# Patient Record
Sex: FEMALE | Race: WHITE | NOT HISPANIC OR LATINO | URBAN - METROPOLITAN AREA
[De-identification: names, ages, dates, MRNs, and addresses within clinical notes are randomized per-mention and may not be internally consistent; named-entity substitution may affect disease eponyms.]

---

## 2017-02-21 ENCOUNTER — OFFICE VISIT - RIVER FALLS (OUTPATIENT)
Dept: FAMILY MEDICINE | Facility: CLINIC | Age: 82
End: 2017-02-21

## 2017-02-21 ASSESSMENT — MIFFLIN-ST. JEOR: SCORE: 965.12

## 2017-04-04 ENCOUNTER — OFFICE VISIT - RIVER FALLS (OUTPATIENT)
Dept: FAMILY MEDICINE | Facility: CLINIC | Age: 82
End: 2017-04-04

## 2017-04-19 ENCOUNTER — OFFICE VISIT - RIVER FALLS (OUTPATIENT)
Dept: FAMILY MEDICINE | Facility: CLINIC | Age: 82
End: 2017-04-19

## 2017-04-19 ASSESSMENT — MIFFLIN-ST. JEOR: SCORE: 966.03

## 2017-06-15 ENCOUNTER — OFFICE VISIT - RIVER FALLS (OUTPATIENT)
Dept: FAMILY MEDICINE | Facility: CLINIC | Age: 82
End: 2017-06-15

## 2017-06-15 ASSESSMENT — MIFFLIN-ST. JEOR: SCORE: 946.07

## 2017-07-31 ENCOUNTER — OFFICE VISIT - RIVER FALLS (OUTPATIENT)
Dept: FAMILY MEDICINE | Facility: CLINIC | Age: 82
End: 2017-07-31

## 2017-07-31 ASSESSMENT — MIFFLIN-ST. JEOR: SCORE: 932.47

## 2017-12-12 ENCOUNTER — AMBULATORY - RIVER FALLS (OUTPATIENT)
Dept: FAMILY MEDICINE | Facility: CLINIC | Age: 82
End: 2017-12-12

## 2017-12-13 LAB
CHOLEST SERPL-MCNC: 125 MG/DL
CHOLEST/HDLC SERPL: 2.7 {RATIO}
CREAT SERPL-MCNC: 1.21 MG/DL (ref 0.6–0.88)
GLUCOSE BLD-MCNC: 99 MG/DL (ref 65–99)
HDLC SERPL-MCNC: 47 MG/DL
LDLC SERPL CALC-MCNC: 58 MG/DL
NONHDLC SERPL-MCNC: 78 MG/DL
TRIGL SERPL-MCNC: 118 MG/DL

## 2017-12-19 ENCOUNTER — OFFICE VISIT - RIVER FALLS (OUTPATIENT)
Dept: FAMILY MEDICINE | Facility: CLINIC | Age: 82
End: 2017-12-19

## 2017-12-19 ASSESSMENT — MIFFLIN-ST. JEOR: SCORE: 932.47

## 2018-05-09 ENCOUNTER — OFFICE VISIT - RIVER FALLS (OUTPATIENT)
Dept: FAMILY MEDICINE | Facility: CLINIC | Age: 83
End: 2018-05-09

## 2019-11-07 ENCOUNTER — COMMUNICATION - RIVER FALLS (OUTPATIENT)
Dept: FAMILY MEDICINE | Facility: CLINIC | Age: 84
End: 2019-11-07

## 2019-11-12 ENCOUNTER — AMBULATORY - RIVER FALLS (OUTPATIENT)
Dept: FAMILY MEDICINE | Facility: CLINIC | Age: 84
End: 2019-11-12

## 2019-11-12 LAB
ALBUMIN UR-MCNC: NEGATIVE G/DL
APPEARANCE UR: NORMAL
BILIRUB UR QL STRIP: NEGATIVE
COLOR UR AUTO: YELLOW
GLUCOSE UR STRIP-MCNC: NEGATIVE MG/DL
HGB UR QL STRIP: NEGATIVE
KETONES UR STRIP-MCNC: NEGATIVE MG/DL
LEUKOCYTE ESTERASE UR QL STRIP: NORMAL
NITRATE UR QL: POSITIVE
PH UR STRIP: 6 [PH]
SP GR UR STRIP: 1.01
UROBILINOGEN UR STRIP-MCNC: NORMAL MG/DL

## 2019-11-14 LAB — BACTERIA SPEC CULT: ABNORMAL

## 2019-11-16 ENCOUNTER — COMMUNICATION - RIVER FALLS (OUTPATIENT)
Dept: FAMILY MEDICINE | Facility: CLINIC | Age: 84
End: 2019-11-16

## 2020-03-09 ENCOUNTER — COMMUNICATION - RIVER FALLS (OUTPATIENT)
Dept: FAMILY MEDICINE | Facility: CLINIC | Age: 85
End: 2020-03-09

## 2020-06-25 ENCOUNTER — COMMUNICATION - RIVER FALLS (OUTPATIENT)
Dept: FAMILY MEDICINE | Facility: CLINIC | Age: 85
End: 2020-06-25

## 2022-02-12 VITALS
BODY MASS INDEX: 30.44 KG/M2 | WEIGHT: 148 LBS | TEMPERATURE: 96.5 F | BODY MASS INDEX: 31.07 KG/M2 | DIASTOLIC BLOOD PRESSURE: 70 MMHG | SYSTOLIC BLOOD PRESSURE: 132 MMHG | OXYGEN SATURATION: 2 % | HEIGHT: 58 IN | HEART RATE: 70 BPM | HEIGHT: 58 IN | DIASTOLIC BLOOD PRESSURE: 74 MMHG | OXYGEN SATURATION: 90 % | TEMPERATURE: 97.8 F | WEIGHT: 145 LBS | SYSTOLIC BLOOD PRESSURE: 114 MMHG | HEART RATE: 74 BPM

## 2022-02-12 VITALS
WEIGHT: 145 LBS | HEART RATE: 84 BPM | HEIGHT: 58 IN | SYSTOLIC BLOOD PRESSURE: 128 MMHG | DIASTOLIC BLOOD PRESSURE: 82 MMHG | TEMPERATURE: 97 F | BODY MASS INDEX: 30.44 KG/M2

## 2022-02-12 VITALS
DIASTOLIC BLOOD PRESSURE: 78 MMHG | HEART RATE: 76 BPM | TEMPERATURE: 97.5 F | WEIGHT: 152.4 LBS | HEIGHT: 58 IN | BODY MASS INDEX: 31.99 KG/M2 | SYSTOLIC BLOOD PRESSURE: 136 MMHG

## 2022-02-12 VITALS
SYSTOLIC BLOOD PRESSURE: 124 MMHG | DIASTOLIC BLOOD PRESSURE: 64 MMHG | WEIGHT: 152.2 LBS | HEIGHT: 58 IN | OXYGEN SATURATION: 93 % | TEMPERATURE: 98 F | HEART RATE: 80 BPM | BODY MASS INDEX: 31.95 KG/M2

## 2022-02-15 NOTE — TELEPHONE ENCOUNTER
---------------------  From: Roseanne Hdez CMA (Gian[facility] Pool ([address]_[place]))   To: Randolph Watson MD;     Sent: 8/22/2019 4:18:27 PM CDT  Subject: FW: Medication Management   Due Date/Time: 8/23/2019 11:19:00 AM CDT     Pt is on hospice at [facility]. Please address refill. Last seen on 12/19/17.  Last filled on 6/26/19 #30 w/ 0 refills.      ------------------------------------------  From: [id number]  To: Randolph Watson MD  Sent: August 22, 2019 11:19:47 AM CDT  Subject: Medication Management  Due: August 23, 2019 11:19:47 AM CDT    ** On Hold Pending Signature **  Drug: levothyroxine (levothyroxine 25 mcg (0.025 mg) oral tablet)  TAKE 1 TAB BY MOUTH ONCE DAILY  Quantity: 30 tab(s)     Days Supply: 28        Refills: 0  Substitutions Allowed  Notes from Pharmacy: NEED NEW SCRIPT PLEASE - THANK YOU    Dispensed Drug: levothyroxine (levothyroxine 25 mcg (0.025 mg) oral tablet)  TAKE 1 TAB BY MOUTH ONCE DAILY  Quantity: 30 tab(s)     Days Supply: 28        Refills: 0  Substitutions Allowed  Notes from Pharmacy: NEED NEW SCRIPT PLEASE - THANK YOU  ---------------------------------------------------------------  From: Randolph Watson MD   [facility]    Sent: 8/22/2019 4:38:13 PM CDT  Subject: FW: Medication Management     ** Submitted: **  Complete:levothyroxine (levothyroxine 25 mcg (0.025 mg) oral tablet)   Signed by Randolph Watson MD  8/22/2019 4:38:00 PM    ** Approved **  levothyroxine (LEVOTHYROXINE 25MCG TABLET)  TAKE 1 TAB BY MOUTH ONCE DAILY  Qty:  30 tab(s)        Days Supply:  28        Refills:  0          Substitutions Allowed     Route To Pharmacy - [id number]   Note from Pharmacy:  NEED NEW SCRIPT PLEASE - THANK YOU

## 2022-02-15 NOTE — TELEPHONE ENCOUNTER
---------------------  From: Emma Booker MA (eRx Pool ([address]_[place]   To: Randolph Watson MD;     Sent: 12/13/2019 8:10:22 AM CST  Subject: FW: Medication Management   Due Date/Time: 12/13/2019 6:21:00 PM CST     PCP:  CHT    Medication:   Levothyroxine  Last Filled:  10/17/19    Quantity:  30  Refills:  0    Medication:   Mirtazipine  Last Filled:  9/19/19    Quantity:  30  Refills:  2    Date of last office visit and reason:   12/19/17  Date of last labs pertaining to condition:  12/12/17 TSH    Note:  Patient appears to be on Hospice, should we fill medications or does she need to be seen?    Return to Clinic order placed?  Yes            ------------------------------------------  From: [facility]  To: Randolph Watson MD  Sent: December 12, 2019 6:21:22 PM CST  Subject: Medication Management  Due: December 13, 2019 6:21:22 PM CST    ** On Hold Pending Signature **  Drug: mirtazapine (mirtazapine 30 mg oral tablet)  TAKE 1 TABLET BY MOUTH AT BEDTIME  Quantity: 30 tab(s)  Days Supply: 14  Refills: 0  Substitutions Allowed  Notes from Pharmacy:     Dispensed Drug: mirtazapine (mirtazapine 30 mg oral tablet)  TAKE 1 TABLET BY MOUTH AT BEDTIME  Quantity: 30 tab(s)  Days Supply: 14  Refills: 0  Substitutions Allowed  Notes from Pharmacy:     ** On Hold Pending Signature **  Drug: levothyroxine (levothyroxine 25 mcg (0.025 mg) oral tablet)  TAKE 1 TAB BY MOUTH ONCE DAILY  Quantity: 30 tab(s)  Days Supply: 28  Refills: 0  Substitutions Allowed  Notes from Pharmacy:     Dispensed Drug: levothyroxine (levothyroxine 25 mcg (0.025 mg) oral tablet)  TAKE 1 TAB BY MOUTH ONCE DAILY  Quantity: 30 tab(s)  Days Supply: 28  Refills: 0  Substitutions Allowed  Notes from Pharmacy:   ---------------------------------------------------------------  From: Randolph Watson MD   To: [id number]    Sent: 12/13/2019 8:27:42 AM CST  Subject: FW: Medication Management      ** Submitted: **  Complete:levothyroxine (levothyroxine 25 mcg (0.025 mg) oral tablet)   Signed by Randolph Watson MD  12/13/2019 8:27:00 AM    ** Submitted: **  Complete:mirtazapine (mirtazapine 30 mg oral tablet)   Signed by Randolph Watson MD  12/13/2019 8:27:00 AM    ** Approved **  mirtazapine (MIRTAZAPINE 30MG TABLET)  TAKE 1 TABLET BY MOUTH AT BEDTIME  Qty:  30 tab(s)        Days Supply:  14        Refills:  0          Substitutions Allowed     Route To [facility] [address] [place] [address]       ** Approved **  levothyroxine (LEVOTHYROXINE 25MCG TABLET)  TAKE 1 TAB BY MOUTH ONCE DAILY  Qty:  30 tab(s)        Days Supply:  28        Refills:  0          Substitutions Allowed     Route To [address]

## 2022-02-15 NOTE — TELEPHONE ENCOUNTER
---------------------  From: Cassandra Espinal CMA (eRx Pool ([address]   To: Randolph Watson MD;     Sent: 2/13/2019 9:47:45 AM CST  Subject: FW: Medication Management   Due Date/Time: 2/13/2019 5:21:00 PM CST     PCP:   CHT     Medication:   acetaminophen and mirtazapine   Last Filled:      Quantity:    Refills:      Date of last office visit and reason:   12/19/17  Date of last labs pertaining to condition:  12/12/17    Note:  refill request from pharmacy, patient is on hospice okay to fill?    Return to Clinic order placed?  yes was due in December 2018    Resource:   _  Phone:   _         ** Patient matched by Cassandra Espinal CMA on 2/13/2019 9:42:56 AM CST **      ------------------------------------------  From: [facility]  To: Betsey SALEEM, [facility]  Sent: February 12, 2019 5:21:26 PM CST  Subject: Medication Management  Due: February 13, 2019 5:21:26 PM CST    ** On Hold Pending Signature **  Drug: acetaminophen (acetaminophen 500 mg oral tablet)  TAKE 2 TABS (1000MG) BY TWICE DAILY.  Quantity: 120 tab(s)    Days Supply: 14        Refills: 0  Substitutions Allowed  Notes from Pharmacy: 2ND FAX REQUEST.    Dispensed Drug: acetaminophen (acetaminophen 500 mg oral tablet)  TAKE 2 TABS (1000MG) BY TWICE DAILY.  Quantity: 120 tab(s)    Days Supply: 14        Refills: 0  Substitutions Allowed  Notes from Pharmacy: 2ND FAX REQUEST.    ** On Hold Pending Signature **  Drug: mirtazapine (mirtazapine 30 mg oral tablet)  TAKE 1 TAB BY MOUTH AT BEDTIME  Quantity: 30 tab(s)     Days Supply: 14        Refills: 0  Substitutions Allowed  Notes from Pharmacy: 2ND FAX REQUEST.    Dispensed Drug: mirtazapine (mirtazapine 30 mg oral tablet)  TAKE 1 TAB BY MOUTH AT BEDTIME  Quantity: 30 tab(s)     Days Supply: 14        Refills: 0  Substitutions Allowed  Notes from Pharmacy: 2ND FAX REQUEST.  ---------------------------------------------------------------  From: Shirley SALEEM [place]   To:  [facility]    Sent: 2/13/2019 10:01:17 AM CST  Subject: FW: Medication Management     ** Submitted: **  Complete:acetaminophen (acetaminophen 500 mg oral tablet)   Signed by Randolph Watson MD  2/13/2019 10:01:00 AM    ** Submitted: **  Complete:mirtazapine (mirtazapine 30 mg oral tablet)   Signed by Randolph Watson MD  2/13/2019 10:01:00 AM    ** Approved **  mirtazapine (MIRTAZAPINE 30MG TABLET)  TAKE 1 TAB BY MOUTH AT BEDTIME  Qty:  30 tab(s)        Days Supply:  14        Refills:  0          DELIO     Route To [facility] #7[facility]   Note from Pharmacy:  2ND FAX REQUEST.  Signed by Randolph Wtason MD    ** Approved **  acetaminophen (ACETAMINOPHEN 500MG ES TABLET)  TAKE 2 TABS (1000MG) BY TWICE DAILY.  Qty:  120 tab(s)        Days Supply:  14        Refills:  0          DELIO     Route To [facility] #786 [facility]   Note from Pharmacy:  2ND FAX REQUEST.  Signed by Randolph Watson MD

## 2022-02-15 NOTE — TELEPHONE ENCOUNTER
---------------------From: Roseanne Hdez CMA (Phone Messages Pool ([address]_[place] To: Judah Leggett MD;   Sent: 11/12/2019 5:29:59 PM CSTSubject: Phone Note: Urine Sample Phone MessagePCP:   CHT  Time of Call:  ??Phone number:  [phone] call at: 5:28 pmNote:   Daughter Priya dropped off a urine sample as patient has been very confused and emotional. Wondering if she has a UTI. Attached are the results to her dipstick and a culture is pending. Do you want to treat now or wait for culture results? Pharmacy: [facility] office visit and reason: 12/19/17Transferred to: Yoav separate note

## 2022-02-15 NOTE — TELEPHONE ENCOUNTER
---------------------  From: Emma Booker MA (eRx Pool ([address]   To: Randolph Watson MD;     Sent: 3/20/2020 8:23:43 AM CDT  Subject: FW: Medication Management   Due Date/Time: 3/20/2020 6:57:00 PM CDT       PCP:   CHT     Medication:  Tylenol  Last Filled:  11/7/19    Quantity:  120  Refills:  0    Date of last office visit and reason:   12/19/2017  Date of last labs pertaining to condition:  n/a    Note:  _    Return to Clinic order placed?  _          ** Not Approved: Medication never prescribed for patient **  sertraline (SERTRALINE 100MG TABLET)  TAKE 1 TABLET BY MOUTH DAILY  Qty:  30 tab(s)        Days Supply:  28        Refills:  0          Substitutions Allowed     Route To Pharmacy - [facility] [address]   Note from Pharmacy:  2ND FAX REQUEST. NEED REFILLS PLEASE--THANK YOU  Signed by Emma Booker MA            ** Patient matched by Emma Booker MA on 3/20/2020 8:19:11 AM CDT **      ------------------------------------------  From: [facility] [address]  To: Randolph Watson MD  Sent: March 19, 2020 6:57:44 PM CDT  Subject: Medication Management  Due: March 20, 2020 6:57:44 PM CDT    ** On Hold Pending Signature **  Drug: acetaminophen (acetaminophen 500 mg oral tablet)  TAKE 2 TABS (1000MG) BY MOUTH TWICE DAILY  Quantity: 120 tab(s)  Days Supply: 27  Refills: 0  Substitutions Allowed  Notes from Pharmacy: NEED REFILLS PLEASE--THANK YOU    Dispensed Drug: PAIN RELIEVER ES 500MG TABLET  TAKE 2 TABS (1000MG) BY MOUTH TWICE DAILY  Quantity: 120 tab(s)  Days Supply: 27  Refills: 0  Substitutions Allowed  Notes from Pharmacy: NEED REFILLS PLEASE--THANK YOU    ** On Hold Pending Signature **  Drug: sertraline (sertraline 100 mg oral tablet)  TAKE 1 TABLET BY MOUTH DAILY  Quantity: 30 tab(s)  Days Supply: 28  Refills: 0  Substitutions Allowed  Notes from Pharmacy: 2ND FAX REQUEST. NEED REFILLS PLEASE--THANK YOU    Dispensed Drug: sertraline (sertraline 100  mg oral tablet)  TAKE 1 TABLET BY MOUTH DAILY  Quantity: 30 tab(s)  Days Supply: 28  Refills: 0  Substitutions Allowed  Notes from Pharmacy: 2ND FAX REQUEST. NEED REFILLS PLEASE--THANK YOU  ---------------------------------------------------------------  From: Randolph Watson MD   To: [facility]    Sent: 3/20/2020 9:20:22 AM CDT  Subject: FW: Medication Management     ** Approved **  Freetext Med (PAIN RELIEVER ES 500MG TABLET)  TAKE 2 TABS (1000MG) BY MOUTH TWICE DAILY  Qty:  120 tab(s)        Days Supply:  27        Refills:  0          Substitutions Allowed     Route To Pharmacy - [facility]   Note from Pharmacy:  NEED REFILLS PLEASE--THANK YOU

## 2022-02-15 NOTE — NURSING NOTE
RN from [facility] called at 12:08 to request a copy of the order given for Cipro on 11/12/19.  I could not print the prescription as it had been completed so I reprinted urine culture with prescription noted and faxed to [facility] [phone]

## 2022-02-15 NOTE — TELEPHONE ENCOUNTER
---------------------  From: Debbie Shin RN (Phone Messages Pool ([address]24_[facility]))   To: T Message Pool (13924_[facility]); Appointment Pool ([address]_[place]);     Sent: 11/17/2021 4:10:27 PM CST  Subject: Update from Hospice     Papito Roldan from [facility] calling to update the team the Faina passed away last night, November 16th.  If there are any questions his number is [phone].DOD noted in patient's registration.---------------------  From: Linh Kendall CMA ([facility] Message Pool ([address]_[facility]))   To: Shirley SALEEM [place];     Sent: 11/18/2021 8:47:43 AM CST  Subject: FW: Update from Hospice

## 2022-02-15 NOTE — TELEPHONE ENCOUNTER
---------------------  From: Cassandra Espinal CMA (eRx Pool ([address]   To: Randolph Watson MD;     Sent: 10/17/2019 11:44:08 AM CDT  Subject: FW: Medication Management   Due Date/Time: 10/18/2019 11:01:00 AM CDT     PCP:   CHT    Medication:   Levothyroxine 25mcg  Last Filled:      Quantity:    Refills:      Date of last office visit and reason:   12/19/17 Dementia  Date of last labs pertaining to condition:      Note:  Refill request from the pharmacy. Patient is currently on Hospice and is at [facility]     Return to Clinic order placed?  _    Resource:   _  Phone:   _       ------------------------------------------  From: [facility]  To: Randolph Watson MD  Sent: October 17, 2019 11:01:17 AM CDT  Subject: Medication Management  Due: October 18, 2019 11:01:17 AM CDT    ** On Hold Pending Signature **  Drug: levothyroxine (levothyroxine 25 mcg (0.025 mg) oral tablet)  TAKE 1 TAB BY MOUTH ONCE DAILY  Quantity: 30 tab(s)  Days Supply: 28  Refills: 0  Substitutions Allowed  Notes from Pharmacy:     Dispensed Drug: levothyroxine (levothyroxine 25 mcg (0.025 mg) oral tablet)  TAKE 1 TAB BY MOUTH ONCE DAILY  Quantity: 30 tab(s)  Days Supply: 28  Refills: 0  Substitutions Allowed  Notes from Pharmacy:   ---------------------------------------------------------------  From: Randolph Watson MD   To: [facility]    Sent: 10/17/2019 1:00:18 PM CDT  Subject: FW: Medication Management     ** Submitted: **  Complete:levothyroxine (levothyroxine 25 mcg (0.025 mg) oral tablet)   Signed by Randolph Watson MD  10/17/2019 1:00:00 PM    ** Approved **  levothyroxine (LEVOTHYROXINE 25MCG TABLET)  TAKE 1 TAB BY MOUTH ONCE DAILY  Qty:  30 tab(s)        Days Supply:  28        Refills:  0          Substitutions Allowed     Route To Pharmacy - [facility]

## 2022-02-15 NOTE — TELEPHONE ENCOUNTER
---------------------  From: Nicole Berg CMA (eRx Pool ([address]_[facility]))   To: Timothy Menon PA-C;     Sent: 9/2/2020 2:29:11 PM CDT  Subject: FW: Medication Management   Due Date/Time: 9/3/2020 10:47:00 AM CDT     CHT OC     last seen 12/2017 by [facility] for Dementia  pt has been in hospice care    please advise on refills      ------------------------------------------  From: [facility]  To: Randolph Watson MD  Sent: September 2, 2020 10:47:13 AM CDT  Subject: Medication Management  Due: September 1, 2020 8:07:28 PM CDT     ** On Hold Pending Signature **     Drug: sertraline (sertraline 100 mg oral tablet), TAKE 1 TABLET BY MOUTH DAILY  Quantity: 30 tab(s)  Days Supply: 28  Refills: 0  Substitutions Allowed  Notes from Pharmacy: NEED REFILLS PLEASE     Dispensed Drug: sertraline (sertraline 100 mg oral tablet), TAKE 1 TABLET BY MOUTH DAILY  Quantity: 30 tab(s)  Days Supply: 28  Refills: 0  Substitutions Allowed  Notes from Pharmacy: NEED REFILLS PLEASE  ---------------------------------------------------------------  From: Timothy Menon PA-C   To: [facility]    Sent: 9/2/2020 2:48:07 PM CDT  Subject: FW: Medication Management     ** Submitted: **  Complete:sertraline (sertraline 100 mg oral tablet)   Signed by Timothy Menon PA-C  9/2/2020 7:48:00 PM [facility]    ** Approved **  sertraline (SERTRALINE 100MG TABLET)  TAKE 1 TABLET BY MOUTH DAILY  Qty:  30 tab(s)        Days Supply:  28        Refills:  0          Substitutions Allowed     Route To Pharmacy - [facility]   Note from Pharmacy:  NEED REFILLS PLEASE  Signed by Timothy Menon PA-C

## 2022-02-15 NOTE — TELEPHONE ENCOUNTER
---------------------  From: Cassandra Espinal CMA (eRx Pool ([address]_[place]   To: Randolph Watson MD;     Sent: 3/8/2019 10:15:31 AM CST  Subject: FW: Medication Management   Due Date/Time: 3/8/2019 2:19:00 PM CST           ------------------------------------------  From: [facility]  To: Randolph Watson MD  Sent: March 7, 2019 2:19:14 PM CST  Subject: Medication Management  Due: March 8, 2019 2:19:14 PM CST    ** On Hold Pending Signature **  Drug: levothyroxine (levothyroxine 25 mcg (0.025 mg) oral tablet)  TAKE 1 TAB BY MOUTH ONCE DAILY  Quantity: 30 tab(s)     Days Supply: 28        Refills: 0  Substitutions Allowed  Notes from Pharmacy: NEED REFILLS PLEASE    Dispensed Drug: levothyroxine (levothyroxine 25 mcg (0.025 mg) oral tablet)  TAKE 1 TAB BY MOUTH ONCE DAILY  Quantity: 30 tab(s)     Days Supply: 28        Refills: 0  Substitutions Allowed  Notes from Pharmacy: NEED REFILLS PLEASE  ---------------------------------------------------------------  From: Randolph Watson MD   To: [facility]    Sent: 3/8/2019 10:22:28 AM CST  Subject: FW: Medication Management     ** Submitted: **  Complete:levothyroxine (levothyroxine 25 mcg (0.025 mg) oral tablet)   Signed by Randolph Watson MD  3/8/2019 10:22:00 AM    ** Approved **  levothyroxine (LEVOTHYROXINE 25MCG TABLET)  TAKE 1 TAB BY MOUTH ONCE DAILY  Qty:  30 tab(s)        Days Supply:  28        Refills:  0          Substitutions Allowed     Route To Pharmacy - [facility]   Note from Pharmacy:  NEED REFILLS PLEASE

## 2022-02-15 NOTE — TELEPHONE ENCOUNTER
---------------------  From: Paige Luo CMA (Gianax Pool ([address]_[place]   To: Randolph Watson MD;     Sent: 1/11/2019 8:21:26 AM CST  Subject: FW: Medication Management   Due Date/Time: 1/11/2019 4:28:00 PM CST     Medication Refill needing approval    PCP:   CHT    Medication:   levothyroxine and olanzapine    Date of last office visit and reason:   12/19/17; dementia  Date of last labs pertaining to condition:  12/12/17    Return to Clinic order placed?  yes; was due in December. Pt is a hospice patient - ok to fill?        ------------------------------------------  [facility]  To: Randolph Watson MD  Sent: January 10, 2019 4:28:11 PM CST  Subject: Medication Management  Due: January 11, 2019 4:28:11 PM CST    ** On Hold Pending Signature **  Drug: OLANZapine (OLANZapine 5 mg oral tablet)  TAKE 1 TAB BY MOUTH ONCE DAILY FOR DEPRESSION DISORDER  Quantity: 30 tab(s)     Days Supply: 28        Refills: 0  Substitutions Allowed  Notes from Pharmacy: NEED REFILLS PLEASE    Dispensed Drug: OLANZapine (OLANZapine 5 mg oral tablet)  TAKE 1 TAB BY MOUTH ONCE DAILY FOR DEPRESSION DISORDER  Quantity: 30 tab(s)     Days Supply: 28        Refills: 0  Substitutions Allowed  Notes from Pharmacy: NEED REFILLS PLEASE    ** On Hold Pending Signature **  Drug: levothyroxine (levothyroxine 25 mcg (0.025 mg) oral tablet)  TAKE 1 TAB BY MOUTH ONCE DAILY  Quantity: 30 tab(s)     Days Supply: 28        Refills: 0  Substitutions Allowed  Notes from Pharmacy: NEED REFILLS PLEASE    Dispensed Drug: levothyroxine (levothyroxine 25 mcg (0.025 mg) oral tablet)  TAKE 1 TAB BY MOUTH ONCE DAILY  Quantity: 30 tab(s)     Days Supply: 28        Refills: 0  Substitutions Allowed  Notes from Pharmacy: NEED REFILLS PLEASE  ---------------------------------------------------------------  From: Randolph Watson MD   [facility]    Sent: 1/11/2019 9:18:34 AM CST  Subject: FW:  Medication Management     ** Submitted: **  Complete:levothyroxine (levothyroxine 25 mcg (0.025 mg) oral tablet)   Signed by Randolph Watson MD  1/11/2019 9:18:00 AM    ** Submitted: **  Complete:OLANZapine (OLANZapine 5 mg oral tablet)   Signed by Randolph Watson MD  1/11/2019 9:18:00 AM    ** Approved **  OLANZapine (OLANZAPINE 5MG TABLET)  TAKE 1 TAB BY MOUTH ONCE DAILY FOR DEPRESSION DISORDER  Qty:  30 tab(s)        Days Supply:  28        Refills:  0          DELIO     Route To [facility] #786 [facility]   Note from Pharmacy:  NEED REFILLS PLEASE      ** Approved **  levothyroxine (LEVOTHYROXINE 25MCG TABLET)  TAKE 1 TAB BY MOUTH ONCE DAILY  Qty:  30 tab(s)        Days Supply:  28        Refills:  0          DELIO     Route To [facility] #786 [facility]   Note from Pharmacy:  NEED REFILLS PLEASE

## 2022-02-15 NOTE — TELEPHONE ENCOUNTER
---------------------  From: Yasmin Titus CMA (eRx Pool ([address]_[place]   To: Randolph Watson MD;     Sent: 4/3/2020 8:28:19 AM CDT  Subject: FW: Medication Management   Due Date/Time: 4/3/2020 6:10:00 PM CDT             ** Patient matched by Yasmin Titus CMA on 4/3/2020 8:25:43 AM CDT **      ------------------------------------------  From: [facility]  To: Randolph Watson MD  Sent: April 2, 2020 6:10:55 PM CDT  Subject: Medication Management  Due: April 3, 2020 6:10:55 PM CDT    ** On Hold Pending Signature **  Drug: sertraline (sertraline 100 mg oral tablet)  TAKE 1 TABLET BY MOUTH DAILY  Quantity: 30 tab(s)  Days Supply: 28  Refills: 0  Substitutions Allowed  Notes from Pharmacy: NEED REFILLS PLEASE - THANK YOU    Dispensed Drug: sertraline (sertraline 100 mg oral tablet)  TAKE 1 TABLET BY MOUTH DAILY  Quantity: 30 tab(s)  Days Supply: 28  Refills: 0  Substitutions Allowed  Notes from Pharmacy: NEED REFILLS PLEASE - THANK YOU    ** On Hold Pending Signature **  Drug: mirtazapine (mirtazapine 30 mg oral tablet)  TAKE 1 TABLET BY MOUTH AT BEDTIME  Quantity: 30 tab(s)  Days Supply: 28  Refills: 0  Substitutions Allowed  Notes from Pharmacy:     Dispensed Drug: mirtazapine (mirtazapine 30 mg oral tablet)  TAKE 1 TABLET BY MOUTH AT BEDTIME  Quantity: 30 tab(s)  Days Supply: 28  Refills: 0  Substitutions Allowed  Notes from Pharmacy:   ---------------------------------------------------------------  From: Randolph Watson MD   To: [facility]    Sent: 4/3/2020 8:41:31 AM CDT  Subject: FW: Medication Management     ** Submitted: **  Complete:mirtazapine (mirtazapine 30 mg oral tablet)   Signed by Randolph Watson MD  4/3/2020 1:41:00 PM    ** Approved **  sertraline (SERTRALINE 100MG TABLET)  TAKE 1 TABLET BY MOUTH DAILY  Qty:  30 tab(s)        Days Supply:  28        Refills:  0          Substitutions Allowed     Route To [facility]acy - [facility]   Note from Pharmacy:  NEED REFILLS PLEASE - THANK YOU      ** Approved **  mirtazapine (MIRTAZAPINE 30MG TABLET)  TAKE 1 TABLET BY MOUTH AT BEDTIME  Qty:  30 tab(s)        Days Supply:  28        Refills:  0          Substitutions Allowed     Route To Pharmacy - [facility]

## 2022-02-15 NOTE — TELEPHONE ENCOUNTER
---------------------  From: Julia Schmidt CMA (eRx Pool ([address]224_[facility]))   To: Advanced Practice Provider Pool ([address]_[facility]);     Sent: 10/30/2020 7:31:57 AM CDT  Subject: compazine refill     CHT OOC until 11/10.    Pt currently at [facility] in [place]. Med is not listed in EMR but noted through scanned hospice documents from 10/12/20.     prochlorperazine 10mg (1 tab oral q 6 hrs prn N&V)    Per [facility] - last filled 8/23/18 #120---------------------  From: Marisela Franklin (Advanced Practice Provider Pool ([address]224_[facility]))   To: eRx Pool ([address]_[place]);     Sent: 10/30/2020 8:12:08 AM CDT  Subject: RE: compazine refill     please send rx for #60 tabs with directions as below  ** Submitted: **  Order:prochlorperazine (prochlorperazine 10 mg oral tablet)  1 tab(s)  Oral  q6 hrs  Qty:  60 tab(s)        Refills:  0          Substitutions Allowed     PRN  nausea/vomitting      Route To Pharmacy - [facility]    Signed by Nicole Berg CMA  10/30/2020 1:56:00 PM [facility]

## 2022-02-15 NOTE — TELEPHONE ENCOUNTER
---------------------  From: Yasmin Titus CMA   To: Randolph Watson MD;     Sent: 9/19/2019 3:31:28 PM CDT  Subject: med refill- Mirtazapine       PCP:  CHT    Medication:  Mirtazapine  Last Filled: 7/25/19   Quantity: 30  Refills:  0    Date of last office visit and reason:  [facility] and [facility]  Date of last labs pertaining to condition: n/a    Note:  Please advise      [facility]  ** Submitted: **  Order:mirtazapine (mirtazapine 30 mg oral tablet)  1 tab(s)  Oral  qhs  Qty:  30 tab(s)        Refills:  2          Route To Pharmacy - [id number]    Signed by Randolph Watson MD  9/19/2019 4:16:00 PM---------------------  From: Randolph Watson MD   To: Yasmin Titus CMA;     Sent: 9/19/2019 4:16:38 PM CDT  Subject: RE: med refill- Mirtazapine     OKfelicia

## 2022-02-15 NOTE — TELEPHONE ENCOUNTER
---------------------  From: Yasmin Titus CMA (eRx Pool ([id number]_[facility]))   To: Randolph Watson MD;     Sent: 9/22/2020 9:30:31 AM CDT  Subject: FW: Medication Management   Due Date/Time: 9/22/2020 6:28:00 PM CDT           Entered by Yasmin Titus CMA on September 22, 2020 9:29:52 AM CDT  Hospice Patient    7/23/2020 # 30, 0    ------------------------------------------  From: [id number]  To: Randolph Watson MD  Sent: September 21, 2020 6:28:20 PM CDT  Subject: Medication Management  Due: September 9, 2020 4:21:06 PM CDT     ** On Hold Pending Signature **     Drug: mirtazapine (mirtazapine 30 mg oral tablet), TAKE 1 TABLET BY MOUTH AT BEDTIME  Quantity: 30 tab(s)  Days Supply: 29  Refills: 0  Substitutions Allowed  Notes from Pharmacy:     Dispensed Drug: mirtazapine (mirtazapine 30 mg oral tablet), TAKE 1 TABLET BY MOUTH AT BEDTIME  Quantity: 30 tab(s)  Days Supply: 29  Refills: 0  Substitutions Allowed  Notes from Pharmacy:     ** On Hold Pending Signature **     Drug: levothyroxine (levothyroxine 25 mcg (0.025 mg) oral tablet), TAKE 1 TAB BY MOUTH DAILY.  Quantity: 30 tab(s)  Days Supply: 28  Refills: 0  Substitutions Allowed  Notes from Pharmacy:     Dispensed Drug: levothyroxine (levothyroxine 25 mcg (0.025 mg) oral tablet), TAKE 1 TAB BY MOUTH DAILY.  Quantity: 30 tab(s)  Days Supply: 28  Refills: 0  Substitutions Allowed  Notes from Pharmacy:  ---------------------------------------------------------------  From: Randolph Watson MD   To: [facility]    Sent: 9/22/2020 10:00:14 AM CDT  Subject: FW: Medication Management     ** Submitted: **  Complete:levothyroxine (levothyroxine 25 mcg (0.025 mg) oral tablet)   Signed by Randolph Watson MD  9/22/2020 3:00:00 PM [facility]    ** Submitted: **  Complete:mirtazapine (mirtazapine 30 mg oral tablet)   Signed by Randolph Watson MD  9/22/2020 3:00:00 PM [facility]    ** Approved with modifications:  **  mirtazapine (MIRTAZAPINE 30MG TABLET)  TAKE 1 TABLET BY MOUTH AT BEDTIME  Qty:  30 tab(s)        Days Supply:  29        Refills:  11          Substitutions Allowed     Route To [facility]786 [facility]       ** Approved with modifications: **  levothyroxine (LEVOTHYROXINE 25MCG TABLET)  TAKE 1 TAB BY MOUTH DAILY.  Qty:  30 tab(s)        Days Supply:  28        Refills:  11          Substitutions Allowed     Route To [facility]

## 2022-02-15 NOTE — TELEPHONE ENCOUNTER
---------------------  From: Hodan Jaffe CMA   To: Andre Khan MD; Phone Messages Pool ([id number]_[facility]);     Cc: Judah Leggett MD;      Sent: 11/16/2019 2:31:29 PM CST !  Subject: urine culture result     Pt had urine sample dropped off on 11/12/19, KWL prescribed Cipro 250 mg 1 tab po, Q12hrs x 7 days  # 14 tabs.  Please advise if appropriate medication.     Allergies: mirtazapine, morphine, nizoral    Pharmacy: [facility]    Results:  Date Result Name Ind Value   11/12/2019 5:18 PM Culture Urine ((A)) See comment      TEST STATUS:       FINAL    SPECIMEN SOURCE:   URINE    SPECIMEN QUALITY:  ADEQUATE    Result:            Greater than 100,000 CFU/mL of Escherichia coli                              E.coli                            ----------------                            INT   MARIUSZ     AMOX/CLAVULANATE       S     <=2     AMPICILLIN             S     8     AMP/SULBACTAM          S     <=2     CEFAZOLIN              NR    <=4 **2     CEFEPIME               S     <=1     CEFTRIAXONE            S     <=1     CIPROFLOXACIN          S     <=0.25     ERTAPENEM              S     <=0.5     GENTAMICIN             S     <=1     IMIPENEM               S     <=0.25     LEVOFLOXACIN           S     <=0.12     NITROFURANTOIN         S     <=16     PIP/TAZOBACTAM         S     <=4     TOBRAMYCIN             S     <=1     TRIMETHOPRIM/SULFA     S     <=20    S=Susceptible  I=Intermediate  R=Resistant  * = Not Tested  NR = Not Reported  **NN = See Therapy Comments      THERAPY COMMENTS        Note 1:      For infections other than uncomplicated UTI      caused by E. coli, K. pneumoniae or P. mirabilis:      Cefazolin is resistant if MARIUSZ > or = 8 mcg/mL.      (Distinguishing susceptible versus intermediate      for isolates with MARIUSZ < or = 4 mcg/mL requires      additional testing.)        Note 2:      For uncomplicated UTI caused by E. coli,      K. pneumoniae or P. mirabilis: Cefazolin is       susceptible if MARIUSZ <32 mcg/mL and predicts      susceptible to the oral agents cefaclor, cefdinir,      cefpodoxime, cefprozil, cefuroxime, cephalexin      and loracarbef.---------------------  From: Andre Khan MD   To: Hodan Jaffe CMA;     Sent: 11/16/2019 2:35:11 PM CST  Subject: RE: urine culture result     if she is having symptoms she should take med for infectiontried calling Priya, her spouse answered, he stated that she will call back( UC number given) wondering how her mom Faina is   doing and that she is on the correct medication for UTI, to call if not improvement after treatment is completed.       DENIS Jaffe CMANoted. Thanks. Please try to contact daughter to make sure she is improving.---------------------  From: Judah Leggett MD   To: Phone Messages Pool ([id number]_[employer]);     Sent: 11/18/2019 3:01:12 PM CST  Subject: FW: urine culture resultReturn Call     Time: 3:30    Note: LM for Priya to call back---------------------  From: Emma Booker MA (Phone Messages Pool ([id number]_[employer])   To: Judah Leggett MD;     Sent: 11/19/2019 11:01:28 AM CST  Subject: RE: urine culture result     Return Call     Time: 11:00    Note: Called and spoke with daughter states she is doing better, but is still weepy.  Daughter is unsure what to do.Noted. Once finished with antibiotic, if she is still having symptoms can repeat the urine culture to confirm that infection has cleared. The cipro should be a good choice.---------------------  From: Judah Leggett MD   To: Phone Messages Pool ([id number]_[employer]);     Sent: 11/19/2019 12:50:22 PM CST  Subject: RE: urine culture resultReturn Call     Time: 1:25pm     Note: LM letting daughter know.

## 2022-02-15 NOTE — NURSING NOTE
Urine Dipstick POC Entered On:  11/12/2019 5:20 PM CST    Performed On:  11/12/2019 5:20 PM CST by Roseanne Hdez CMA               Urine Dipstick POC   Urine Color Urine Dipstick :   Yellow   Urine Appearance Urine Dipstick :   Cloudy   Glucose Urine Dipstick :   Negative   Bilirubin Urine Dipstick :   Negative   Ketones Urine Dipstick :   Negative   Specific Gravity Urine Dipstick :   1.015   Blood Urine Dipstick :   Negative   pH Urine Dipstick :   6   Protein Urine Dipstick :   Negative   Urobilinogen Urine Dipstick :   0.2 mg/dl   Nitrite Urine Dipstick :   Positive   Leukocytes Urine Dipstick :   Trace   Roseanne Hdez CMA - 11/12/2019 5:20 PM CST   Details   Collection Date :   11/12/2019 4:30 PM CST   Handling Specimen POC :   Voided   POC Test Comments :   Lab Test Performed by:   [facility] Office  [address] [place] [address]  Phone: [phone]  Fax: [phone]     Roseanne Hdez CMA - 11/12/2019 5:20 PM CST

## 2022-02-15 NOTE — TELEPHONE ENCOUNTER
---------------------  From: Cassandra Espinal CMA (eRx Pool ([id number]_[place]))   To: Randolph Watson MD;     Sent: 5/3/2019 9:32:52 AM CDT  Subject: FW: Medication Management   Due Date/Time: 5/3/2019 12:37:00 PM CDT       Patient is currently on Hospice, okay to refill?     ------------------------------------------  From:  Pharmacy #[id number]  To: Randolph Watson MD  Sent: May 2, 2019 12:37:56 PM CDT  Subject: Medication Management  Due: May 3, 2019 12:37:56 PM CDT    ** On Hold Pending Signature **  Drug: levothyroxine (levothyroxine 25 mcg (0.025 mg) oral tablet)  TAKE 1 TAB BY MOUTH ONCE DAILY  Quantity: 30 tab(s)     Days Supply: 28        Refills: 0  Substitutions Allowed  Notes from Pharmacy: NEED NEW SCRIPT PLEASE - THANK YOU    Dispensed Drug: levothyroxine (levothyroxine 25 mcg (0.025 mg) oral tablet)  TAKE 1 TAB BY MOUTH ONCE DAILY  Quantity: 30 tab(s)     Days Supply: 28        Refills: 0  Substitutions Allowed  Notes from Pharmacy: NEED NEW SCRIPT PLEASE - THANK YOU  ---------------------------------------------------------------  From: Randolph Watson MD   To: [facility]    Sent: 5/3/2019 9:53:54 AM CDT  Subject: FW: Medication Management     ** Submitted: **  Complete:levothyroxine (levothyroxine 25 mcg (0.025 mg) oral tablet)   Signed by Randolph Watson MD  5/3/2019 9:53:00 AM    ** Approved **  levothyroxine (LEVOTHYROXINE 25MCG TABLET)  TAKE 1 TAB BY MOUTH ONCE DAILY  Qty:  30 tab(s)        Days Supply:  28        Refills:  0          Substitutions Allowed     Route To Pharmacy - [id number]   Note from Pharmacy:  NEED NEW SCRIPT PLEASE - THANK YOU

## 2022-02-15 NOTE — PROGRESS NOTES
Patient:   DARIANA CARUSO            MRN: [id number]            FIN: [id number]               Age:   89 years     Sex:  Female     :  10/13/1924   Associated Diagnoses:   Hypothyroidism; Dementia; Comfort measures only status; Major depressive disorder, recurrent episode, moderate   Author:   Randolph Watson MD      Chief Complaint   2017 10:30 AM CST  HTN med check, review labs, check ear     Is becoming demented.  Requesting comfort care.  Will simplify medications.      Subjective   Chief complaint 2017 10:30 AM CST  HTN med check, review labs, check ear   Ear OK.        Health Status   Allergies:    Allergic Reactions (Selected)  Severity Not Documented  Mirtazapine (Insomnia)  Morphine (Hives)  Nizoral (Headache, nausea, restless)   Medications:  (Selected)   Prescriptions  Prescribed  OLANZapine 5 mg oral tablet: 1 tab(s) ( 5 mg ), po, daily, # 90 tab(s), 3 Refill(s), Type: Soft Stop, Pharmacy: [id number], 1 tab(s) po daily  levothyroxine 25 mcg (0.025 mg) oral tablet: 1 tab(s) ( 25 mcg ), PO, Daily, # 90 tab(s), 3 Refill(s), Type: Maintenance, Pharmacy: [id number], 1 tab(s) po daily  mirtazapine 30 mg oral tablet: 1 tab(s) ( 30 mg ), PO, Once a day (at bedtime), # 90 tab(s), 3 Refill(s), Type: Maintenance, Pharmacy: [id number], 1 tab(s) po hs   Problem list:    All Problems (Selected)  Hypothyroidism / SNOMED CT 640583507 / Confirmed  Complete heart block / SNOMED CT 03391685 / Confirmed  CN (Constipation) / SNOMED CT 82838472 / Confirmed  Esophageal dysmotility / SNOMED CT 217697982 / Confirmed  Hypertension / SNOMED CT 0647971437 / Confirmed  Insomnia / SNOMED CT 243286545 / Confirmed  Obese / SNOMED CT 2595991412 / Probable  Dysphagia / SNOMED CT 0858783880 / Confirmed  Osteoarthritis / SNOMED CT 7408012904 / Confirmed  Osteoporosis / SNOMED CT 006219955 / Confirmed  HLD (hyperlipidemia) / SNOMED CT 082001546 / Confirmed  Major depressive disorder,  recurrent episode, moderate / SNOMED CT 415213351 / Confirmed  Restrictive lung disease / SNOMED CT 79014853 / Confirmed      Objective   Vital Signs   12/19/2017 10:30 AM CST Temperature Tympanic 97 DegF  LOW    Peripheral Pulse Rate 84 bpm    Pulse Site Radial artery    HR Method Manual    Systolic Blood Pressure 128 mmHg    Diastolic Blood Pressure 82 mmHg  HI    Mean Arterial Pressure 97 mmHg    BP Site Left arm    BP Method Manual      Measurements from flowsheet : Measurements   12/19/2017 10:30 AM CST Height Measured - Standard 58 in    Weight Measured - Standard 145 lb    BSA 1.64 m2    Body Mass Index 30.3 kg/m2      General:  Alert and oriented, No acute distress.    Neck:  Supple.    Respiratory:  Lungs are clear to auscultation.    Cardiovascular:  Normal rate, Regular rhythm.    Neurologic:       Orientation: To person.    Psychiatric:  Cooperative.       Results Review   Results review   Lab results   12/12/2017 9:47 AM CST Sodium Level 142 mmol/L    Potassium Level 4.4 mmol/L    Chloride Level 104 mmol/L    CO2 Level 34 mmol/L  HI    Glucose Level 99 mg/dL    BUN 21 mg/dL    Creatinine 1.21 mg/dL  HI    BUN/Creat Ratio 17    eGFR 39 mL/min/1.73m2  LOW    eGFR African American 45 mL/min/1.73m2  LOW    Calcium Level 9.6 mg/dL    Bili Total 0.3 mg/dL    Alk Phos 53 unit/L    AST/SGOT 20 unit/L    ALT/SGPT 16 unit/L    Protein Total 6.7 gm/dL    Albumin Level 3.8 gm/dL    Globulin 2.9    A/G Ratio 1.3    Cholesterol 125 mg/dL    Non-HDL 78    HDL 47 mg/dL  LOW    Chol/HDL Ratio 2.7    LDL 58    Triglyceride 118 mg/dL    TSH 4.08 mIU/L    WBC 5.7    RBC 3.53  LOW    Hgb 11.3 gm/dL  LOW    Hct 34.0 %  LOW    MCV 96.3 fL    MCH 32.0 pg    MCHC 33.2 gm/dL    RDW 11.9 %    Platelet 176    MPV 12.3 fL         Impression and Plan   Assessment and Plan:          Diagnosis: Hypothyroidism (EUW14-BI E03.4), Dementia (EET41-VE F03.90), Comfort measures only status (IFX86-WY Z51.5), Major depressive disorder,  recurrent episode, moderate (NBE17-UG F33.1).         Course: Progressing as expected.    Orders     Orders (Selected)   Prescriptions  Prescribed  OLANZapine 5 mg oral tablet: 1 tab(s) ( 5 mg ), po, daily, # 90 tab(s), 3 Refill(s), Type: Soft Stop, Pharmacy: [id number], 1 tab(s) po daily  levothyroxine 25 mcg (0.025 mg) oral tablet: 1 tab(s) ( 25 mcg ), PO, Daily, # 90 tab(s), 3 Refill(s), Type: Maintenance, Pharmacy: [id number], 1 tab(s) po daily  mirtazapine 30 mg oral tablet: 1 tab(s) ( 30 mg ), PO, Once a day (at bedtime), # 90 tab(s), 3 Refill(s), Type: Maintenance, Pharmacy: [id number], 1 tab(s) po hs.     Will consult, Hospice.     .

## 2022-02-15 NOTE — TELEPHONE ENCOUNTER
---------------------  From: Cassandra Espinal CMA (eRx Pool ([id number]_[place] [facility] [place]))   To: Timothy Menon PA-C;     Sent: 6/25/2020 1:31:24 PM CDT  Subject: FW: Medication Management   Due Date/Time: 6/26/2020 11:48:00 AM CDT     Patient is currently at [facility] on hospice with [facility]. please advise     Cassandra BAGLEY CMA         ** Patient matched by Cassandra Espinal CMA on 6/25/2020 1:30:23 PM CDT **      ------------------------------------------  From: [facility]  To: Dedrick Watson MD  Sent: June 25, 2020 11:48:10 AM CDT  Subject: Medication Management  Due: June 24, 2020 10:12:23 PM CDT     ** On Hold Pending Signature **     Drug: sertraline (sertraline 100 mg oral tablet), TAKE 1 TABLET BY MOUTH DAILY  Quantity: 30 tab(s)  Days Supply: 28  Refills: 0  Substitutions Allowed  Notes from Pharmacy:     Dispensed Drug: sertraline (sertraline 100 mg oral tablet), TAKE 1 TABLET BY MOUTH DAILY  Quantity: 30 tab(s)  Days Supply: 28  Refills: 0  Substitutions Allowed  Notes from Pharmacy:  ---------------------------------------------------------------  From: Timothy Menon PA-C   To: [facility]    Sent: 6/25/2020 4:32:18 PM CDT  Subject: FW: Medication Management     ** Submitted: **  Complete:sertraline (sertraline 100 mg oral tablet)   Signed by Timothy Menon PA-C  6/25/2020 9:32:00 PM [facility]    ** Approved **  sertraline (SERTRALINE 100MG TABLET)  TAKE 1 TABLET BY MOUTH DAILY  Qty:  30 tab(s)        Days Supply:  28        Refills:  0          Substitutions Allowed     Route To Pharmacy - [facility]   Signed by Timothy Menon PA-C

## 2022-02-15 NOTE — TELEPHONE ENCOUNTER
---------------------  From: Yasmin Titus CMA (eRx Pool ([id number]_[place]))   To: Randolph Watson MD;     Sent: 5/30/2019 2:06:24 PM CDT  Subject: FW: Medication Management   Due Date/Time: 5/31/2019 1:46:00 PM CDT           Entered by Yasmin Titus CMA on May 30, 2019 2:06:19 PM CDT  [facility]  last filled: 4/9/19 #30      ** Patient matched by Yasmin Titus CMA on 5/30/2019 2:05:33 PM CDT **      ------------------------------------------  From: [facility]  To: Judah Leggett MD  Sent: May 30, 2019 1:46:33 PM CDT  Subject: Medication Management  Due: May 31, 2019 1:46:33 PM CDT    ** On Hold Pending Signature **  Drug: mirtazapine (mirtazapine 30 mg oral tablet)  TAKE 1 TAB BY MOUTH AT BEDTIME  Quantity: 30 tab(s)     Days Supply: 14        Refills: 0  Substitutions Allowed  Notes from Pharmacy: NEED REFILLS PLEASE--THANK YOU    Dispensed Drug: mirtazapine (mirtazapine 30 mg oral tablet)  TAKE 1 TAB BY MOUTH AT BEDTIME  Quantity: 30 tab(s)     Days Supply: 14        Refills: 0  Substitutions Allowed  Notes from Pharmacy: NEED REFILLS PLEASE--THANK YOU  ---------------------------------------------------------------  From: Randolph Watson MD   To: [facility]    Sent: 5/30/2019 2:12:56 PM CDT  Subject: FW: Medication Management     ** Submitted: **  Complete:mirtazapine (mirtazapine 30 mg oral tablet)   Signed by Randolph Watson MD  5/30/2019 2:12:00 PM    ** Approved **  mirtazapine (MIRTAZAPINE 30MG TABLET)  TAKE 1 TAB BY MOUTH AT BEDTIME  Qty:  30 tab(s)        Days Supply:  14        Refills:  0          Substitutions Allowed     Route To Pharmacy - [facility]   Note from Pharmacy:  NEED REFILLS PLEASE--THANK YOU  Signed by Randolph Watson MD

## 2022-02-15 NOTE — TELEPHONE ENCOUNTER
---------------------  From: Judah Leggett MD   To: Phone Messages Pool ([id number]_[facility]);     Sent: 11/12/2019 5:30:20 PM CST  Subject: UA results     No evidence of recurrent infections. Unless she has a fever or pain, I would recommend that we wait for culture results to determine if antibitoics would be helpful.Returned Call  Time: 5:38 pm  Note:  Called & spoke with daughter. Advised that we would wait for culture which is up to 48 hours. States that pt is constantly crying and that it burns when she urinates. I did transfer the call for KWL to discuss.---------------------  From: Roseanne Hdez CMA (Phone Messages [employer] (76334_[facility])   To: Judah Leggett MD;     Sent: 11/12/2019 5:43:16 PM CST  Subject: FW: UA resultsDiscussed with daughter. Reports patient is crying when voiding, appears uncomfortable. Will start cipro as ordered. Culture is pending

## 2022-02-15 NOTE — TELEPHONE ENCOUNTER
---------------------  From: Emma Booker MA (Phone Messages Pool ([id number] [facility] [place])   To: Randolph Watson MD;     Sent: 2/21/2019 8:44:37 AM CST  Subject: Med Refill: Fax     PCP:   [facility]    Medication:   Olanzapine  Last Filled:  1/11/19   Quantity:  30  Refills:  0    Date of last office visit and reason:   12/19/17 is on Hospice  Date of last labs pertaining to condition:  N/A    Note:  Fax from pharmacy asking for refill. Or if it has been discontinued send a D/C order.     Return to Clinic order placed?  Patient is on Hospice  ** Submitted: **  Order:OLANZapine (OLANZapine 5 mg oral tablet)  1 tab(s)  Oral  daily  FOR DEPRESSION DISORDER.  Qty:  30 tab(s)        Refills:  5          Route To Pharmacy - [facility]    Signed by Randolph Watson MD  2/21/2019 10:29:00 AM---------------------  From: Randolph Watson MD   To: Phone Messages Pool ([id number]_[place] [facility]);     Sent: 2/21/2019 10:29:44 AM CST  Subject: RE: Med Refill: Fax     oknoted

## 2022-02-15 NOTE — TELEPHONE ENCOUNTER
Entered by Roseanne Hdez CMA on August 05, 2020 11:48:51 AM CDT  ---------------------  From: Roseanne Hdez CMA   To: [facility] [address]    Sent: 8/5/2020 11:48:51 AM CDT  Subject: Medication Management     ** Submitted: **  Order:acetaminophen (acetaminophen 500 mg oral tablet)  2 tab(s)  Oral  bid  Qty:  120 tab(s)        Refills:  1          Substitutions Allowed     PRN  as needed for pain      Route To Pharmacy - [facility] [address]    Signed by Roseanne Hdez CMA  8/5/2020 4:47:00 PM [facility]    ** Submitted: **  Complete:acetaminophen (acetaminophen 500 mg oral tablet)   Signed by Roseanne Hdez CMA  8/5/2020 4:48:00 PM [facility]    ** Not Approved:  **  acetaminophen (PAIN RELIEVER ES 500MG TABLET)  TAKE 2 TABS (1000mg) BY MOUTH TWICE DAILY  Qty:  120 tab(s)        Days Supply:  30        Refills:  0          Substitutions Allowed     Route To Pharmacy - [address]   Note from Pharmacy:  NEED REFILLS PLEASE - THANK YOU  Signed by Roseanne Hdez CMA            ------------------------------------------  From: [facility]  To: Randolph Watson MD  Sent: August 5, 2020 11:36:45 AM CDT  Subject: Medication Management  Due: July 28, 2020 10:29:52 PM CDT     ** On Hold Pending Signature **     Drug: acetaminophen (acetaminophen 500 mg oral tablet), TAKE 2 TABS (1000mg) BY MOUTH TWICE DAILY  Quantity: 120 tab(s)  Days Supply: 30  Refills: 0  Substitutions Allowed  Notes from Pharmacy: NEED REFILLS PLEASE - THANK YOU     Dispensed Drug: acetaminophen (acetaminophen 500 mg oral tablet), TAKE 2 TABS (1000mg) BY MOUTH TWICE DAILY  Quantity: 120 tab(s)  Days Supply: 30  Refills: 0  Substitutions Allowed  Notes from Pharmacy: NEED REFILLS PLEASE - THANK YOU  ------------------------------------------Patient is on hospice.

## 2022-02-15 NOTE — TELEPHONE ENCOUNTER
Entered by Cassandra Espinal CMA on May 05, 2020 11:37:46 AM CDT  ---------------------  From: Cassandra Espinal CMA   To: [facility]    Sent: 5/5/2020 11:37:46 AM CDT  Subject: Medication Management     ** Submitted: **  Order:acetaminophen (acetaminophen 500 mg oral tablet)  2 tab(s)  Oral  bid  Qty:  120 cap(s)        Days Supply:  28        Refills:  0          Substitutions Allowed     Route To Pharmacy - [address]    Signed by Cassandra Espinal CMA  5/5/2020 4:37:00 PM    ** Submitted: **  Complete:acetaminophen (acetaminophen 500 mg oral tablet)   Signed by Cassandra sEpinal CMA  5/5/2020 4:37:00 PM    ** Not Approved:  **  acetaminophen (ACETAMINOPHEN ES 500MG CAPLET)  TAKE 2 TABS (1000MG) BY MOUTH TWICE DAILY  Qty:  120 cap(s)        Days Supply:  28        Refills:  0          Substitutions Allowed     Route To Pharmacy - [facility]   Signed by Cassandra Espinal CMA            ------------------------------------------  From: [facility]  To: Randolph Watson MD  Sent: May 5, 2020 11:24:08 AM CDT  Subject: Medication Management  Due: May 6, 2020 11:24:08 AM CDT    ** On Hold Pending Signature **  Drug: acetaminophen (acetaminophen 500 mg oral tablet)  TAKE 2 TABS (1000MG) BY MOUTH TWICE DAILY  Quantity: 120 cap(s)  Days Supply: 28  Refills: 0  Substitutions Allowed  Notes from Pharmacy:     Dispensed Drug: acetaminophen (acetaminophen 500 mg oral tablet)  TAKE 2 TABS (1000MG) BY MOUTH TWICE DAILY  Quantity: 120 cap(s)  Days Supply: 28  Refills: 0  Substitutions Allowed  Notes from Pharmacy:   ------------------------------------------

## 2022-02-15 NOTE — NURSING NOTE
Date of last office visit and reason: 12/19/17      Date of last Med Check / Px:   12/19/17  Date of last labs pertaining to med:  12/19/2017    RTC order in chart:  Yes    For Protocol refill, has patient been contacted:  N\A

## 2022-02-15 NOTE — PROGRESS NOTES
Chief Complaint  HTN med check, check spot on L ear  History of Present Illness  Renuka comes in today follow-up of her blood pressure. ?She needs her atenolol refilled. ?In addition to this she has a small actinic keratosis versus basal cell on her left ear. ?We did try freezing it but that did not seem to work. ?[facility]  She no longer recognizes me. ?Daughter notes that she is failing. ?We did discuss comfort care for which we are all in agreement that is the best way to go.  Review of Systems  Review of systems is negative for any headache. ?She does have some neck pain from holding her neck to the side. ?Her daughter gives her a heating pad and that seems to help. ?Also uses Tylenol on a regular basis and I advised the daughter that she could give her Tylenol as often as the patient wanted up to 8 tablets a day.  Problem List/Past Medical History  Ongoing  CN (Constipation)  Complete heart block  Dysphagia  Esophageal dysmotility  HLD (hyperlipidemia)  Hypertension  Hypothyroidism  Insomnia  Major depressive disorder, recurrent episode, moderate  Obese  Osteoarthritis  Osteoporosis  Restrictive lung disease  Resolved  *Hospitalized@[facility] - UTI, hypoxia  Procedure/Surgical History  Cataract surgery (2012),  Echocardiogram (06/15/2011),  Cardiac pacemaker implant (1993),  Back surgery (1989),  Childbirth.  Home Medications  aspirin, 81 mg, daily  atenolol 25 mg oral tablet, 25 mg, 1 tab(s), po, daily, 3 refills  Calcium 600+D, 1 tab(s), po, daily  levothyroxine 25 mcg (0.025 mg) oral tablet, 25 mcg, 1 tab(s), po, daily, 1 refills  mirtazapine 30 mg oral tablet, 30 mg, 1 tab(s), po, hs, 3 refills  OLANZapine 5 mg oral tablet, 5 mg, 1 tab(s), po, daily, 3 refills  simvastatin 20 mg oral tablet, 20 mg, 1 tab(s), po, hs, 3 refills  Allergies  Nizoral?(Headache, nausea, restless)  mirtazapine?(insomnia)  morphine?(Hives)  Social History  Alcohol - Denies Alcohol  Use  Employment and Education  Retired, Work/School description: Housewife.  Other  .  Family History  Breast cancer: Mother.  CA - Lung cancer: Sister.  Coronary artery disease: Brother.  Diabetes mellitus: Daughter.  Hypertension: Sister.  Immunizations  Physical Exam  Vitals & Measurements  T:?97.8?(Temporal Artery)?  HR:?74?(Peripheral)?  BP:?132/74?  SpO2:?90%?  HT:?58?in?  WT:?148?lb?  BMI:?30.93?  Physical exam shows vital signs stable afebrile no apparent distress. ?She is pleasant but mildly confused.  HEENT shows a small lesion on her left ear. ?We discussed removing it but I think this would be more painful than helpful.  Neck supple and kyphotic. ?No adenopathy.  Lungs clear to A&P  CV regular  Lab Results  Diagnostic Results  Assessment/Plan  Hypertension  We will treat with a low dose atenolol.  Ordered:  atenolol, 1 tab(s) ( 25 mg ), PO, Daily, # 90 tab(s), 3 Refill(s), Type: Maintenance, Pharmacy: [facility] #[id number], 1 tab(s) po daily  Hypothyroidism  We will recheck in 1 month  Major depressive disorder, recurrent episode, moderate  This is actually improved. ?She and her daughter notes that she is less depressed. ?We will continue with the olanzapine and the mirtazapine.

## 2022-02-15 NOTE — TELEPHONE ENCOUNTER
---------------------  From: Cassandra Espinal CMA (eRx Pool ([id number]))   To: Timothy Menon PA-C;     Sent: 7/25/2019 2:15:54 PM CDT  Subject: FW: Medication Management   Due Date/Time: 7/26/2019 12:12:00 PM CDT         Patient is currently on hospice  ------------------------------------------  From: [id number]  To: Judah Leggett MD  Sent: July 25, 2019 12:12:57 PM CDT  Subject: Medication Management  Due: July 26, 2019 12:12:57 PM CDT    ** On Hold Pending Signature **  Drug: mirtazapine (mirtazapine 30 mg oral tablet)  TAKE 1 TAB BY MOUTH AT BEDTIME  Quantity: 30 tab(s)     Days Supply: 14        Refills: 0  Substitutions Allowed  Notes from Pharmacy: NEED NEW SCRIPT PLEASE - THANK YOU    Dispensed Drug: mirtazapine (mirtazapine 30 mg oral tablet)  TAKE 1 TAB BY MOUTH AT BEDTIME  Quantity: 30 tab(s)     Days Supply: 14        Refills: 0  Substitutions Allowed  Notes from Pharmacy: NEED NEW SCRIPT PLEASE - THANK YOU  ---------------------------------------------------------------  From: Timothy Menon PA-C   To: [id number]    Sent: 7/25/2019 2:35:45 PM CDT  Subject: FW: Medication Management     ** Submitted: **  Complete:mirtazapine (mirtazapine 30 mg oral tablet)   Signed by Timothy Menon PA-C  7/25/2019 2:35:00 PM    ** Approved **  mirtazapine (MIRTAZAPINE 30MG TABLET)  TAKE 1 TAB BY MOUTH AT BEDTIME  Qty:  30 tab(s)        Days Supply:  14        Refills:  0          Substitutions Allowed     Route To Pharmacy - [id number]   Note from Pharmacy:  NEED NEW SCRIPT PLEASE - THANK YOU  Signed by Timothy Menon PA-C

## 2022-02-15 NOTE — TELEPHONE ENCOUNTER
---------------------  From: Cassandra Espinal CMA (eRx Pool ([id number]))   To: Randolph Watson MD;     Sent: 6/26/2019 3:31:50 PM CDT  Subject: FW: Medication Management   Due Date/Time: 6/27/2019 2:45:00 PM CDT     Patient is on Hospice please advise      ------------------------------------------  From: [id number]  To: Randolph Watson MD  Sent: June 26, 2019 2:45:38 PM CDT  Subject: Medication Management  Due: June 27, 2019 2:45:38 PM CDT    ** On Hold Pending Signature **  Drug: levothyroxine (levothyroxine 25 mcg (0.025 mg) oral tablet)  TAKE 1 TAB BY MOUTH ONCE DAILY  Quantity: 30 tab(s)     Days Supply: 28        Refills: 0  Substitutions Allowed  Notes from Pharmacy: NEED REFILLS PLEASE--THANK YOU    Dispensed Drug: levothyroxine (levothyroxine 25 mcg (0.025 mg) oral tablet)  TAKE 1 TAB BY MOUTH ONCE DAILY  Quantity: 30 tab(s)     Days Supply: 28        Refills: 0  Substitutions Allowed  Notes from Pharmacy: NEED REFILLS PLEASE--THANK YOU  ---------------------------------------------------------------  From: Randolph Watson MD   To: [id number]    Sent: 6/26/2019 4:53:53 PM CDT  Subject: FW: Medication Management     ** Submitted: **  Complete:levothyroxine (levothyroxine 25 mcg (0.025 mg) oral tablet)   Signed by Randolph Watson MD  6/26/2019 4:53:00 PM    ** Approved **  levothyroxine (LEVOTHYROXINE 25MCG TABLET)  TAKE 1 TAB BY MOUTH ONCE DAILY  Qty:  30 tab(s)        Days Supply:  28        Refills:  0          Substitutions Allowed     Route To Pharmacy - [id number]   Note from Pharmacy:  NEED REFILLS PLEASE--THANK YOU

## 2022-02-15 NOTE — TELEPHONE ENCOUNTER
Entered by Paige Luo CMA on March 09, 2020 8:13:43 AM CDT  ---------------------  From: Paige Luo CMA   To: [id number]    Sent: 3/9/2020 8:13:40 AM CDT  Subject: Medication Management     ** Not Approved: Patient has requested refill too soon, #30 sent 2/25/20 **  levothyroxine (LEVOTHYROXINE 25MCG TABLET)  TAKE 1 TAB BY MOUTH ONCE DAILY  Qty:  30 tab(s)        Days Supply:  28        Refills:  0          Substitutions Allowed     Route To Pharmacy - [id number]   Signed by Paige Luo CMA            ** Patient matched by Paige Luo CMA on 3/9/2020 8:12:08 AM CDT **      ------------------------------------------  From: [id number]  To: Randolph Watson MD  Sent: March 6, 2020 6:41:00 PM CST  Subject: Medication Management  Due: March 7, 2020 6:41:00 PM CST    ** On Hold Pending Signature **  Drug: levothyroxine (levothyroxine 25 mcg (0.025 mg) oral tablet)  TAKE 1 TAB BY MOUTH ONCE DAILY  Quantity: 30 tab(s)  Days Supply: 28  Refills: 0  Substitutions Allowed  Notes from Pharmacy:     Dispensed Drug: levothyroxine (levothyroxine 25 mcg (0.025 mg) oral tablet)  TAKE 1 TAB BY MOUTH ONCE DAILY  Quantity: 30 tab(s)  Days Supply: 28  Refills: 0  Substitutions Allowed  Notes from Pharmacy:   ------------------------------------------

## 2022-02-15 NOTE — PROGRESS NOTES
Chief Complaint  HTN med check, c/o tenderness on side of neck  History of Present Illness  Patient is a 89-year-old female well-known to me comes in for follow-up of multiple problems. ?She is attended by her daughter.  The first problem is dysphagia. ?She has an esophageal diverticulum which had a recent swallowing study required her to use pur?ed food with a nectar thickened liquid. ?Daughter is wondering if something can be done regarding the diverticulum and/or possible esophageal dilation to make it easier for her to swallow so she can get back to more regular food.  In addition is that she is here for follow-up of her hypertension. ?It is well controlled on the atenolol and she is not having problems with it.  She also has restrictive lung disease and is on home O2. ?She will need to be seen by respiratory therapy for reevaluation for home O2. ?She will be required to meet Medicare guidelines.  She is also in for follow-up of her depression this is going well. ?She is on mirtazapine and olanzapine and that appears to be working combination for her.  She is also in for follow-up of hypothyroidism will be evaluating her TSH and refilling her thyroid.  Finally she is in for follow-up of her cholesterol. ?Her lab work is up-to-date. ?She will be needing a refill of her simvastatin.  Review of Systems  Review of systems is negative for?fever chills or night sweats. ?She has had no nausea vomiting or diarrhea she has no chest pain, shortness of breath or difficulty breathing.  Problem List/Past Medical History  Ongoing  CN (Constipation)  Complete heart block  Dysphagia  Esophageal dysmotility  HLD (hyperlipidemia)  Hypertension  Hypothyroidism  Insomnia  Major depressive disorder, recurrent episode, moderate  Obese  Osteoarthritis  Osteoporosis  Restrictive lung disease  Resolved  *Hospitalized@[facility] - UTI, hypoxia  Procedure/Surgical History  Cataract surgery (2012),  Echocardiogram (06/15/2011),  Cardiac  pacemaker implant (1993),  Back surgery (1989),  Childbirth.  Home Medications  aspirin, 81 mg, daily  atenolol 25 mg oral tablet, 25 mg, 1 tab(s), po, daily, 3 refills  Calcium 600+D, 1 tab(s), po, daily  levothyroxine 25 mcg (0.025 mg) oral tablet, 25 mcg, 1 tab(s), po, daily, 3 refills  mirtazapine 30 mg oral tablet, 30 mg, 1 tab(s), po, hs, 3 refills  OLANZapine 5 mg oral tablet, 5 mg, 1 tab(s), po, daily, 3 refills  simvastatin 20 mg oral tablet, 20 mg, 1 tab(s), po, hs, 3 refills  Allergies  Nizoral?(Headache, nausea, restless)  mirtazapine?(insomnia)  morphine?(Hives)  Social History  Alcohol - Denies Alcohol Use  Employment and Education  Retired, Work/School description: Housewife.  Other  .  Family History  Breast cancer: Mother.  CA - Lung cancer: Sister.  Coronary artery disease: Brother.  Diabetes mellitus: Daughter.  Hypertension: Sister.  Immunizations  Physical Exam  Vitals & Measurements  T:?98?(Tympanic)?  HR:?80?(Peripheral)?  BP:?124/64?  SpO2:?93%?  HT:?58?in?  WT:?152.2?lb?  BMI:?31.81?  Physical exam shows vital signs stable afebrile no apparent distress.  HEENT shows she is quite kyphotic. ?She has some left-sided neck pain that resolved with Tylenol. ?I recommended he continue using it as needed. ?She is edentulous but I do not see any sores or lesions in her mouth.  Lungs are clear to auscultation  CV regular  Abdomen soft nontender  Extremities without clubbing cyanosis or edema.  Lab Results  Previous lab results reviewed.  Diagnostic Results  Swallowing study reviewed.  Assessment/Plan  Acquired hypothyroidism  Ordered:  levothyroxine, 1 tab(s) ( 25 mcg ), PO, Daily, # 90 tab(s), 3 Refill(s), Type: Maintenance, Pharmacy: [id number], 1 tab(s) po daily  Dysphagia  I advised the daughter that there is probably not much that can be done but she would like to get a second opinion and so will send her to Dr. Sky for his opinion.  Ordered:  Surgical Consult  (Request)  Hypertension  This is well controlled and will refill her medications as below.  Ordered:  atenolol, 1 tab(s) ( 25 mg ), PO, Daily, # 90 tab(s), 3 Refill(s), Type: Maintenance, Pharmacy: [facility] #[id number], 1 tab(s) po daily  Major depressive disorder, recurrent episode, moderate  This is well controlled and will continue medications below.  Ordered:  mirtazapine, 1 tab(s) ( 30 mg ), PO, Once a day (at bedtime), # 90 tab(s), 3 Refill(s), Type: Maintenance, Pharmacy: [facility] #[id number], 1 tab(s) po hs  OLANZapine, 1 tab(s) ( 5 mg ), po, daily, # 90 tab(s), 3 Refill(s), Type: Soft Stop, Pharmacy: [facility] #[id number], 1 tab(s) po daily  Pure hypercholesterolemia  This is well controlled and will continue with medication below.  Ordered:  simvastatin, 1 tab(s) ( 20 mg ), po, hs, Instructions: Needs labs before next refill., # 90 tab(s), 3 Refill(s), Type: Maintenance, Pharmacy: [facility] #[id number], 1 tab(s) po hs,Instr:Needs labs before next refill.  Restrictive lung disease  She does need to see respiratory therapy and have them do whatever is required to renew her home oxygen. ?I believe she needs it.  We will see her back in 3 months.  Ordered:  Referral (Request)Patient has been using and needing O2 every night and that has been helping her nocturnal hypoxemia.  I would like her to continue this oxygen use indefinately.

## 2022-02-15 NOTE — TELEPHONE ENCOUNTER
Entered by Randolph Watson MD on August 26, 2021 8:31:10 AM CDT  ---------------------  From: Randolph Watson MD   To: [address]    Sent: 8/26/2021 8:31:10 AM CDT  Subject: Medication Management     ** Submitted: **  Complete:levothyroxine (levothyroxine 25 mcg (0.025 mg) oral tablet)   Signed by Randolph Watson MD  8/26/2021 1:31:00 PM [facility]    ** Submitted: **  Complete:mirtazapine (mirtazapine 30 mg oral tablet)   Signed by Randolph Watson MD  8/26/2021 1:31:00 PM [facility]    ** Approved **  mirtazapine (MIRTAZAPINE 30MG TABLET)  TAKE 1 TABLET BY MOUTH AT BEDTIME  Qty:  30 tab(s)        Days Supply:  0        Refills:  10          Substitutions Allowed     Route To [address]       ** Approved **  levothyroxine (LEVOTHYROXINE 25MCG TABLET)  TAKE 1 TAB BY MOUTH DAILY.  Qty:  30 tab(s)        Days Supply:  0        Refills:  10          Substitutions Allowed     Route To [address]               ------------------------------------------  From: [id number]  To: Randolph Watson MD  Sent: August 25, 2021 1:14:34 PM CDT  Subject: Medication Management  Due: August 20, 2021 11:16:59 AM CDT     ** On Hold Pending Signature **     Drug: MIRTAZAPINE 30MG TABLET, TAKE 1 TABLET BY MOUTH AT BEDTIME  Quantity: 30 unknown unit  Days Supply: 30  Refills: 0  Substitutions Allowed  Notes from Pharmacy:     Dispensed Drug: mirtazapine (mirtazapine 30 mg oral tablet), TAKE 1 TABLET BY MOUTH AT BEDTIME  Quantity: 30 tab(s)  Days Supply: 0  Refills: 10  Substitutions Allowed  Notes from Pharmacy:     ** On Hold Pending Signature **     Drug: LEVOTHYROXINE 25MCG TABLET, TAKE 1 TAB BY MOUTH DAILY.  Quantity: 30 unknown unit  Days Supply: 30  Refills: 0  Substitutions Allowed  Notes from Pharmacy:     Dispensed Drug: levothyroxine (levothyroxine 25 mcg (0.025 mg) oral tablet), TAKE 1 TAB BY MOUTH DAILY.  Quantity: 30  tab(s)  Days Supply: 0  Refills: 10  Substitutions Allowed  Notes from Pharmacy:  ------------------------------------------

## 2022-02-15 NOTE — NURSING NOTE
Date of last office visit and reason:  12/19/17      Date of last Med Check / Px:   _  Date of last labs pertaining to med:  _    RTC order in chart:  yes due in December    For Protocol refill, has patient been contacted:  yes called [facility] to have them inform family that patient is due for appt.

## 2022-02-15 NOTE — TELEPHONE ENCOUNTER
---------------------  From: Roseanne Hdez CMA (eRx Pool ([id number]_[place] - [place]))   To: Randolph Watson MD;     Sent: 2/25/2020 8:48:23 AM CST  Subject: FW: Medication Management   Due Date/Time: 2/25/2020 3:14:00 PM CST     Medication Refill Needing Approval  PCP:   [facility]    Date of last office visit and reason:   12/19/17  Date of last labs pertaining to condition:  12/12/17    Note:  Pt is currently living at [facility] and was just recertified for hospice in December. Pt is currently on these two medications.    Return to Clinic order placed?  ?? Please advise on refills and if you want patient to continue taking these medications.           ** Patient matched by Roseanne Hdez CMA on 2/25/2020 8:41:49 AM CST **      ------------------------------------------  From: [id number]  To: Randolph Watson MD  Sent: February 24, 2020 3:14:48 PM CST  Subject: Medication Management  Due: February 25, 2020 3:14:48 PM CST    ** On Hold Pending Signature **  Drug: mirtazapine (mirtazapine 30 mg oral tablet)  TAKE 1 TABLET BY MOUTH AT BEDTIME  Quantity: 30 tab(s)  Days Supply: 14  Refills: 0  Substitutions Allowed  Notes from Pharmacy:     Dispensed Drug: mirtazapine (mirtazapine 30 mg oral tablet)  TAKE 1 TABLET BY MOUTH AT BEDTIME  Quantity: 30 tab(s)  Days Supply: 14  Refills: 0  Substitutions Allowed  Notes from Pharmacy:     ** On Hold Pending Signature **  Drug: levothyroxine (levothyroxine 25 mcg (0.025 mg) oral tablet)  TAKE 1 TAB BY MOUTH ONCE DAILY  Quantity: 30 tab(s)  Days Supply: 28  Refills: 0  Substitutions Allowed  Notes from Pharmacy:     Dispensed Drug: levothyroxine (levothyroxine 25 mcg (0.025 mg) oral tablet)  TAKE 1 TAB BY MOUTH ONCE DAILY  Quantity: 30 tab(s)  Days Supply: 28  Refills: 0  Substitutions Allowed  Notes from Pharmacy:   ---------------------------------------------------------------  From: Randolph Watson MD   To: [id number]    Sent: 2/25/2020  9:43:52 AM CST  Subject: FW: Medication Management     ** Submitted: **  Complete:mirtazapine (mirtazapine 30 mg oral tablet)   Signed by Randolph Watsno MD  2/25/2020 9:43:00 AM    ** Submitted: **  Complete:levothyroxine (levothyroxine 25 mcg (0.025 mg) oral tablet)   Signed by Randolph Watson MD  2/25/2020 9:43:00 AM    ** Approved **  levothyroxine (LEVOTHYROXINE 25MCG TABLET)  TAKE 1 TAB BY MOUTH ONCE DAILY  Qty:  30 tab(s)        Days Supply:  28        Refills:  0          Substitutions Allowed     Route To [facility]       ** Approved **  mirtazapine (MIRTAZAPINE 30MG TABLET)  TAKE 1 TABLET BY MOUTH AT BEDTIME  Qty:  30 tab(s)        Days Supply:  14        Refills:  0          Substitutions Allowed     Route To [facility] #7[facility]

## 2022-02-15 NOTE — TELEPHONE ENCOUNTER
Entered by Yasmin Titus CMA on November 07, 2019 2:04:24 PM CST  ---------------------  From: Yasmin Titus CMA   To: [address]    Sent: 11/7/2019 2:04:24 PM CST  Subject: Medication Management     ** Submitted: **  Order:acetaminophen (acetaminophen 500 mg oral tablet)  2 tab(s)  Oral  bid  Qty:  120 tab(s)        Days Supply:  15        Refills:  0          Substitutions Allowed     Route To Pharmacy - [address]    Signed by Yasmin Titus CMA  11/7/2019 2:03:00 PM    ** Submitted: **  Complete:acetaminophen (acetaminophen 500 mg oral tablet)   Signed by Yasmin Titus CMA  11/7/2019 2:04:00 PM    ** Not Approved:  **  acetaminophen (ACETAMINOPHEN 500MG ES TABLET)  TAKE 2 TABS (1000MG) BY MOUTH TWICE DAILY.  Qty:  120 tab(s)        Days Supply:  15        Refills:  0          Substitutions Allowed     Route To Pharmacy - [address]   Note from Pharmacy:  PT IS REQUESTING REFILLS  Signed by Yasmin Titus CMA            ** Patient matched by Yasmin Titus CMA on 11/7/2019 2:02:15 PM CST **      ------------------------------------------  From: [address]  To: Judah Leggett MD  Sent: November 7, 2019 1:03:36 PM CST  Subject: Medication Management  Due: November 8, 2019 1:03:36 PM CST    ** On Hold Pending Signature **  Drug: acetaminophen (acetaminophen 500 mg oral tablet)  TAKE 2 TABS (1000MG) BY MOUTH TWICE DAILY.  Quantity: 120 tab(s)  Days Supply: 15  Refills: 0  Substitutions Allowed  Notes from Pharmacy: PT IS REQUESTING REFILLS    Dispensed Drug: acetaminophen (acetaminophen 500 mg oral tablet)  TAKE 2 TABS (1000MG) BY MOUTH TWICE DAILY.  Quantity: 120 tab(s)  Days Supply: 15  Refills: 0  Substitutions Allowed  Notes from Pharmacy: PT IS REQUESTING REFILLS  ------------------------------------------Hospice pt

## 2022-02-15 NOTE — PROGRESS NOTES
Patient:   DARIANA CARUSO            MRN: [id number]            FIN: [id number]               Age:   89 years     Sex:  Female     :  10/13/1924   Associated Diagnoses:   AK (actinic keratosis)   Author:   Randolph Watson MD      Procedure   Dermatology Surgical Procedure   Date/ Time:  2017 1:53:00 PM.     Confirmed: patient, procedure, side, and site are correct, safety procedures followed.     Informed consent: signed by patient.     Performed by: Randolph Watson MD.     Indication: remove lesion.     Procedure performed: cryosurgery.     Cryosurgery: site: left ear, liquid nitrogen applied.        Impression and Plan   Diagnosis     AK (actinic keratosis) ([id number] L57.0).     Orders     F/U  if not improving, sooner if getting worse.

## 2022-02-15 NOTE — TELEPHONE ENCOUNTER
---------------------  From: Emma Booker MA (eRx Pool ([address]_[place] - [place]))   To: Timothy Menon PA-C;     Sent: 7/23/2020 1:09:08 PM CDT  Subject: FW: Medication Management   Due Date/Time: 7/24/2020 12:39:00 PM CDT     Patient is currently at [facility] on hospice with [facility]. please advise   Gabby      ------------------------------------------  From: [facility]  To: Randolph Watson MD  Sent: July 23, 2020 12:39:18 PM CDT  Subject: Medication Management  Due: July 22, 2020 2:29:57 PM CDT     ** On Hold Pending Signature **     Drug: levothyroxine (levothyroxine 25 mcg (0.025 mg) oral tablet), TAKE 1 TAB BY MOUTH DAILY.  Quantity: 30 tab(s)  Days Supply: 28  Refills: 0  Substitutions Allowed  Notes from Pharmacy:     Dispensed Drug: levothyroxine (levothyroxine 25 mcg (0.025 mg) oral tablet), TAKE 1 TAB BY MOUTH DAILY.  Quantity: 30 tab(s)  Days Supply: 28  Refills: 0  Substitutions Allowed  Notes from Pharmacy:     ** On Hold Pending Signature **     Drug: mirtazapine (mirtazapine 30 mg oral tablet), TAKE 1 TABLET BY MOUTH AT BEDTIME  Quantity: 30 tab(s)  Days Supply: 28  Refills: 0  Substitutions Allowed  Notes from Pharmacy:     Dispensed Drug: mirtazapine (mirtazapine 30 mg oral tablet), TAKE 1 TABLET BY MOUTH AT BEDTIME  Quantity: 30 tab(s)  Days Supply: 28  Refills: 0  Substitutions Allowed  Notes from Pharmacy:  ---------------------------------------------------------------  From: Timothy Menon PA-C   To: [id number]    Sent: 7/23/2020 2:52:56 PM CDT  Subject: FW: Medication Management     ** Submitted: **  Complete:mirtazapine (mirtazapine 30 mg oral tablet)   Signed by Timothy Menon PA-C  7/23/2020 7:52:00 PM [facility]    ** Submitted: **  Complete:levothyroxine (levothyroxine 25 mcg (0.025 mg) oral tablet)   Signed by Timothy Menon PA-C  7/23/2020 7:52:00 PM [facility]    ** Approved **  levothyroxine (LEVOTHYROXINE 25MCG TABLET)  TAKE 1 TAB BY MOUTH  DAILY.  Qty:  30 tab(s)        Days Supply:  28        Refills:  0          Substitutions Allowed     Route To [id number]   Signed by Timothy Menon PA-C    ** Approved **  mirtazapine (MIRTAZAPINE 30MG TABLET)  TAKE 1 TABLET BY MOUTH AT BEDTIME  Qty:  30 tab(s)        Days Supply:  28        Refills:  0          Substitutions Allowed     Route To [id number]   Signed by Timothy Menon PA-C

## 2022-02-15 NOTE — TELEPHONE ENCOUNTER
---------------------  From: Roseanne Hdez CMA (eRx Pool ([address]_[place]))   To: Randolph Watson MD;     Sent: 4/8/2019 3:57:06 PM CDT  Subject: FW: Medication Management: Mirtazapine   Due Date/Time: 4/9/2019 12:39:00 PM CDT     Medication Refill Needing Approval  PCP:   [facility]    Medication:   Mirtazapine  Last Filled:  2/13/19   Quantity:  30  Refills:  0  CSA on file?   n/a     Date of last office visit and reason:    12/19/17; dementia   Date of last labs pertaining to condition:  _    Note:  Pt is currently on Hospice    Return to Clinic order placed?  ??          ** Patient matched by Roseanne Hdez CMA on 4/8/2019 3:24:11 PM CDT **      ------------------------------------------  From: [address]  To: Judah Leggett MD  Sent: April 8, 2019 12:39:38 PM CDT  Subject: Medication Management  Due: April 9, 2019 12:39:38 PM CDT    ** On Hold Pending Signature **  Drug: mirtazapine (mirtazapine 30 mg oral tablet)  TAKE 1 TAB BY MOUTH AT BEDTIME  Quantity: 30 tab(s)     Days Supply: 14        Refills: 0  Substitutions Allowed  Notes from Pharmacy: 2ND FAX REQUEST.    Dispensed Drug: mirtazapine (mirtazapine 30 mg oral tablet)  TAKE 1 TAB BY MOUTH AT BEDTIME  Quantity: 30 tab(s)     Days Supply: 14        Refills: 0  Substitutions Allowed  Notes from Pharmacy: 2ND FAX REQUEST.  ---------------------------------------------------------------  From: Randolph Watson MD   To: [address]    Sent: 4/9/2019 7:58:47 AM CDT  Subject: FW: Medication Management: Mirtazapine     ** Submitted: **  Complete:mirtazapine (mirtazapine 30 mg oral tablet)   Signed by Randolph Watson MD  4/9/2019 7:58:00 AM    ** Approved **  mirtazapine (MIRTAZAPINE 30MG TABLET)  TAKE 1 TAB BY MOUTH AT BEDTIME  Qty:  30 tab(s)        Days Supply:  14        Refills:  0          Substitutions Allowed     Route To Pharmacy - [id number]   Note from Pharmacy:  2ND FAX  REQUEST.  Signed by Shirley SALEEM, Randolph

## 2022-02-15 NOTE — PROGRESS NOTES
Patient:   DARIANA CARUSO            MRN: [id number]            FIN: [id number]               Age:   89 years     Sex:  Female     :  10/13/1924   Associated Diagnoses:   Left leg pain; Actinic keratosis   Author:   Timothy Menon PA-C      Visit Information   Visit type:  General concerns.    Accompanied by:  Family member.    Source of history:  Self, Family member, Medical record.    Referral source:  Self.       Chief Complaint   2017 2:56 PM CDT    c/o left leg pain; starts down low and goes up into her hip; started yesterday      History of Present Illness             The patient presents with lower extremity pain.  The location of the lower extremity pain is the left, Starts mid thigh and goes to left ankle. No injury or fall. no fever or chills. No rash. CC above noted and confirmed with the patient. Lives at [facility]..  [place] area on left ear..        Review of Systems   Constitutional:  Negative.    Musculoskeletal:  Negative except as documented in history of present illness.    Integumentary:  Negative except as documented in history of present illness.    Neurologic:  Negative.       Health Status   Allergies:    Allergic Reactions (All)  Severity Not Documented  Mirtazapine (Insomnia)  Morphine (Hives)  Nizoral (Headache, nausea, restless)   Medications:  (Selected)   Prescriptions  Prescribed  OLANZapine 5 mg oral tablet: 1 tab(s) ( 5 mg ), po, daily, # 90 tab(s), 3 Refill(s), Type: Soft Stop, Pharmacy: [id number], 1 tab(s) po daily  atenolol 25 mg oral tablet: 1 tab(s) ( 25 mg ), PO, Daily, # 90 tab(s), 3 Refill(s), Type: Maintenance, Pharmacy: [facility] #[id number], 1 tab(s) po daily  levothyroxine 25 mcg (0.025 mg) oral tablet: 1 tab(s) ( 25 mcg ), PO, Daily, # 90 tab(s), 1 Refill(s), Type: Maintenance, Pharmacy: [facility] #[id number], 1 tab(s) po daily  mirtazapine 30 mg oral tablet: 1 tab(s) ( 30 mg ), PO, Once a day (at bedtime), # 90 tab(s), 3 Refill(s), Type: Maintenance, Pharmacy:  [facility] #[id number], 1 tab(s) po hs  simvastatin 20 mg oral tablet: 1 tab(s) ( 20 mg ), po, hs, Instructions: Needs labs before next refill., # 90 tab(s), 3 Refill(s), Type: Maintenance, Pharmacy: [facility] #[id number], 1 tab(s) po hs,Instr:Needs labs before next refill.  Documented Medications  Documented  Calcium 600+D: 1 tab(s), po, daily, 0 Refill(s), Type: Maintenance  aspirin: ( 81 mg ), Daily, 0 Refill(s)   Problem list:    All Problems (Selected)  Hypothyroidism / SNOMED CT 163536768 / Confirmed  Complete heart block / SNOMED CT 55116254 / Confirmed  CN (Constipation) / SNOMED CT 19184473 / Confirmed  Esophageal dysmotility / SNOMED CT 914559631 / Confirmed  Hypertension / SNOMED CT 5721208250 / Confirmed  Insomnia / SNOMED CT 141337393 / Confirmed  Obese / SNOMED CT 4545137947 / Probable  Dysphagia / SNOMED CT 1524310429 / Confirmed  Osteoarthritis / SNOMED CT 2334185440 / Confirmed  Osteoporosis / SNOMED CT 531609638 / Confirmed  HLD (hyperlipidemia) / SNOMED CT 227381239 / Confirmed  Major depressive disorder, recurrent episode, moderate / SNOMED CT 160897344 / Confirmed  Restrictive lung disease / SNOMED CT 92741361 / Confirmed      Histories   Past Medical History:    Active  HLD (hyperlipidemia) (877782366)  Hypertension (7997444047)  Osteoporosis (779232321)  Osteoarthritis (8628403408)  Major depressive disorder, recurrent episode, moderate (458775930)  Obese (2536844420)  Esophageal dysmotility (932542922)  Insomnia (136648854)  Resolved  *Hospitalized@[facility] - UTI, hypoxia: Onset on 2015 at 89 years.  Resolved on 2015 at 89 years.   Family History:    Diabetes mellitus  Daughter (Erica)  CA - Lung cancer  Sister  Comments:  2010 12:19 PM - Leti Piedad  Was a smoker.  in her 60's.  Breast cancer  Mother ()  Hypertension  Sister  Coronary artery disease  Brother     Procedure history:    Cataract surgery (452270582) in  at 88 Years.  Comments:  2013 8:22 AM  - Roseanne Hdez CMA  Both Eyes  Echocardiogram (2061114779) on 6/15/2011 at 86 Years.  Cardiac pacemaker implant (6883048868) in  at 69 Years.  Back surgery in  at 65 Years.  Childbirth (9031196324).  Comments:  2010 12:22 PM - TamikashahrzadPiedad crawford   9, para 7, ab 2   Social History:        Alcohol Assessment: Denies Alcohol Use      Tobacco Assessment            Never      Employment and Education Assessment            Retired, Work/School description: Housewife.      Home and Environment Assessment            Marital status:  in .  Lives with Self.  7 children.  Living situation: Home/Independent.  Home               equipment: Walker/Cane.  Smoker in household: No.      Nutrition and Health Assessment            Type of diet: Regular.      Exercise and Physical Activity Assessment: Occasional exercise      Sexual Assessment            Sexually active: No.      Other Assessment            .        Physical Examination   Vital Signs   2017 2:56 PM CDT Temperature Tympanic 96.5 DegF  LOW    Peripheral Pulse Rate 70 bpm    Pulse Site Radial artery    HR Method Electronic    Systolic Blood Pressure 114 mmHg    Diastolic Blood Pressure 70 mmHg    Mean Arterial Pressure 85 mmHg    BP Site Left arm    BP Method Manual    Oxygen Saturation 2 %  LOW    Oxygen Flow Rate 96 L/min    Oxygen Therapy Nasal cannula      Measurements from flowsheet : Measurements   2017 2:56 PM CDT Height Measured - Standard 58 in    Weight Measured - Standard 145 lb    BSA 1.64 m2    Body Mass Index 30.3 kg/m2      Musculoskeletal:  Favors left leg. No palpable tenderness. No erythema or warmth. No rash. Decreased ROM of the left hip. No effusion..    Integumentary:  No rash, Erythematous lesion 3mm on left ear. Consistent with AK.  Treated with liquid nitrogen x two. Tolerated without complication..    Neurologic:  No focal deficits.       Review / Management   Radiology results   Appears normal to my read,  waiting for official read.  Will contact patient with any other findings.      Impression and Plan   Diagnosis     Left leg pain (QHD21-VD M79.605).     Actinic keratosis (DLV19-IX L57.0).     Patient Instructions:       Counseled: Patient, Family, Regarding diagnosis, Regarding medications, Activity.    May use Tylenol PRN. FU PRN.

## 2022-02-15 NOTE — TELEPHONE ENCOUNTER
---------------------  From: Cassandra Espinal CMA (eRx Pool ([address]_[place]))   To: Randolph Watson MD;     Sent: 6/1/2020 12:07:30 PM CDT  Subject: FW: Medication Management   Due Date/Time: 5/30/2020 6:23:00 PM CDT     Patient is on Hospice and lives at [facility].     Please Advise    Cassandra BAGLEY CMA       ------------------------------------------  From: [facility]  To: Randolph Watson MD  Sent: May 29, 2020 6:23:54 PM CDT  Subject: Medication Management  Due: May 30, 2020 3:27:43 PM CDT     ** On Hold Pending Signature **     Drug: levothyroxine (levothyroxine 25 mcg (0.025 mg) oral tablet), TAKE 1 TAB BY MOUTH DAILY.  Quantity: 30 tab(s)  Days Supply: 28  Refills: 0  Substitutions Allowed  Notes from Pharmacy:     Dispensed Drug: levothyroxine (levothyroxine 25 mcg (0.025 mg) oral tablet), TAKE 1 TAB BY MOUTH DAILY.  Quantity: 30 tab(s)  Days Supply: 28  Refills: 0  Substitutions Allowed  Notes from Pharmacy:     ** On Hold Pending Signature **     Drug: sertraline (sertraline 100 mg oral tablet), TAKE 1 TABLET BY MOUTH DAILY  Quantity: 30 tab(s)  Days Supply: 28  Refills: 0  Substitutions Allowed  Notes from Pharmacy:     Dispensed Drug: sertraline (sertraline 100 mg oral tablet), TAKE 1 TABLET BY MOUTH DAILY  Quantity: 30 tab(s)  Days Supply: 28  Refills: 0  Substitutions Allowed  Notes from Pharmacy:     ** On Hold Pending Signature **     Drug: mirtazapine (mirtazapine 30 mg oral tablet), TAKE 1 TABLET BY MOUTH AT BEDTIME  Quantity: 30 tab(s)  Days Supply: 28  Refills: 0  Substitutions Allowed  Notes from Pharmacy:     Dispensed Drug: mirtazapine (mirtazapine 30 mg oral tablet), TAKE 1 TABLET BY MOUTH AT BEDTIME  Quantity: 30 tab(s)  Days Supply: 28  Refills: 0  Substitutions Allowed  Notes from Pharmacy:  ---------------------------------------------------------------  From: Randolph Watson MD   To: [id number]    Sent: 6/1/2020 2:06:12 PM CDT  Subject: FW:  Medication Management     ** Submitted: **  Complete:mirtazapine (mirtazapine 30 mg oral tablet)   Signed by Randolph Watson MD  6/1/2020 7:06:00 PM    ** Submitted: **  Complete:sertraline (sertraline 100 mg oral tablet)   Signed by Randolph Watson MD  6/1/2020 7:06:00 PM    ** Submitted: **  Complete:levothyroxine (levothyroxine 25 mcg (0.025 mg) oral tablet)   Signed by Randolph Watson MD  6/1/2020 7:06:00 PM    ** Approved **  levothyroxine  TAKE 1 TAB BY MOUTH DAILY.  Qty:  30 tab(s)        Refills:  0          Substitutions Allowed     Details:  30 tab(s), TAKE 1 TAB BY MOUTH DAILY., Route to Pharmacy [facility] [place] [address], 3/20/2020, 5/28/2020, 28, Randolph Watson MD      Route To [facility] [place] [address]       ** Approved **  sertraline  TAKE 1 TABLET BY MOUTH DAILY  Qty:  30 tab(s)        Refills:  0          Substitutions Allowed     Details:  30 tab(s), TAKE 1 TABLET BY MOUTH DAILY, Route to Pharmacy [facility] [place] [address], 4/3/2020, 5/28/2020, 28, Randolph Watson MD      Route To [facility] [place] [address]       ** Approved **  mirtazapine  TAKE 1 TABLET BY MOUTH AT BEDTIME  Qty:  30 tab(s)        Refills:  0          Substitutions Allowed     Details:  30 tab(s), TAKE 1 TABLET BY MOUTH AT BEDTIME, Route to Pharmacy [facility] [place] [address], 4/3/2020, 5/28/2020, 28, Randolph Watson MD      Route To [facility] [address]